# Patient Record
Sex: MALE | Race: WHITE | Employment: UNEMPLOYED | URBAN - METROPOLITAN AREA
[De-identification: names, ages, dates, MRNs, and addresses within clinical notes are randomized per-mention and may not be internally consistent; named-entity substitution may affect disease eponyms.]

---
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
## 2022-09-13 ENCOUNTER — OFFICE VISIT (OUTPATIENT)
Dept: PODIATRY | Age: 22
End: 2022-09-13
Payer: MEDICARE

## 2022-09-13 VITALS — BODY MASS INDEX: 21.79 KG/M2 | HEIGHT: 66 IN

## 2022-09-13 DIAGNOSIS — M79.674 PAIN IN TOE OF RIGHT FOOT: ICD-10-CM

## 2022-09-13 DIAGNOSIS — L03.031 PARONYCHIA OF GREAT TOE OF RIGHT FOOT: Primary | ICD-10-CM

## 2022-09-13 DIAGNOSIS — L60.0 ONYCHOCRYPTOSIS: ICD-10-CM

## 2022-09-13 PROCEDURE — 1036F TOBACCO NON-USER: CPT | Performed by: PODIATRIST

## 2022-09-13 PROCEDURE — G8427 DOCREV CUR MEDS BY ELIG CLIN: HCPCS | Performed by: PODIATRIST

## 2022-09-13 PROCEDURE — G8420 CALC BMI NORM PARAMETERS: HCPCS | Performed by: PODIATRIST

## 2022-09-13 PROCEDURE — 99213 OFFICE O/P EST LOW 20 MIN: CPT | Performed by: PODIATRIST

## 2022-09-13 RX ORDER — DOXYCYCLINE HYCLATE 100 MG/1
100 CAPSULE ORAL 2 TIMES DAILY
Qty: 28 CAPSULE | Refills: 0 | Status: SHIPPED | OUTPATIENT
Start: 2022-09-13 | End: 2022-09-27

## 2022-09-13 ASSESSMENT — ENCOUNTER SYMPTOMS
BACK PAIN: 0
NAUSEA: 0
SHORTNESS OF BREATH: 0
COLOR CHANGE: 0
DIARRHEA: 0

## 2022-09-13 NOTE — PROGRESS NOTES
[address] [facility] Podiatry  Return Patient Progress Note    Subjective: Sharan Macknezie 25 y.o. male that presents with chief complaint of possible ingrown nail to the right great toe. Chief Complaint   Patient presents with    Ingrown Toenail     Right hallux, possible ingrown, has some drainage     Patient states that this has been present for about one week. Patient states that he has been getting some clear drainage to the right great toe. Pain is rated 5 out of 10 and is described as intermittent. Patient denies any treatment to the right great toe prior to today. Review of Systems   Constitutional:  Negative for activity change, appetite change, chills, diaphoresis, fatigue and fever. Respiratory:  Negative for shortness of breath. Cardiovascular:  Negative for leg swelling. Gastrointestinal:  Negative for diarrhea and nausea. Endocrine: Negative for cold intolerance, heat intolerance and polyuria. Musculoskeletal:  Negative for arthralgias, back pain, gait problem, joint swelling and myalgias. Skin:  Negative for color change, pallor, rash and wound. Allergic/Immunologic: Negative for environmental allergies and food allergies. Neurological:  Negative for dizziness, weakness, light-headedness and numbness. Hematological:  Does not bruise/bleed easily. Psychiatric/Behavioral:  Negative for behavioral problems, confusion and self-injury. The patient is not nervous/anxious. Objective: Clinical evaluation of the patient reveals ingrowth to the medial border of the right hallux toenail. There is erythema and edema noted to the right hallux. There is no calor noted. There is mild serous drainage noted to the medial border of the right hallux, but no malodor is noted. There is pain with palpation to the right hallux. Assessment:    Diagnosis Orders   1. Paronychia of great toe of right foot  doxycycline hyclate (VIBRAMYCIN) 100 MG capsule      2.  Onychocryptosis  doxycycline hyclate (VIBRAMYCIN) 100 MG capsule      3. Pain in toe of right foot  doxycycline hyclate (VIBRAMYCIN) 100 MG capsule            Plan: 1. Clinical evaluation of the patient. 2. I recommended removing the ingrown nail from the right hallux, but the patient declines. Therefore, the patient is to soak the right hallux for 20 minutes daily with warm water and Epsom salts. Patient was given a prescription for doxycycline 100 mg bid for 14 days. Patient will be seen in 2 weeks and at that time I may again recommend removal of the ingrown nail. 3. Return in about 2 weeks (around 9/27/2022) for evaluation of ingrown nail right hallux.    9/13/2022      Magan Hebert DPM

## 2022-09-19 ENCOUNTER — OFFICE VISIT (OUTPATIENT)
Dept: PODIATRY | Age: 22
End: 2022-09-19
Payer: MEDICARE

## 2022-09-19 VITALS — BODY MASS INDEX: 26.03 KG/M2 | WEIGHT: 162 LBS | HEIGHT: 66 IN

## 2022-09-19 DIAGNOSIS — M79.674 PAIN IN TOE OF RIGHT FOOT: ICD-10-CM

## 2022-09-19 DIAGNOSIS — L60.0 ONYCHOCRYPTOSIS: ICD-10-CM

## 2022-09-19 DIAGNOSIS — L03.031 PARONYCHIA OF GREAT TOE OF RIGHT FOOT: Primary | ICD-10-CM

## 2022-09-19 PROCEDURE — 1036F TOBACCO NON-USER: CPT | Performed by: PODIATRIST

## 2022-09-19 PROCEDURE — G8419 CALC BMI OUT NRM PARAM NOF/U: HCPCS | Performed by: PODIATRIST

## 2022-09-19 PROCEDURE — 99213 OFFICE O/P EST LOW 20 MIN: CPT | Performed by: PODIATRIST

## 2022-09-19 PROCEDURE — 11750 EXCISION NAIL&NAIL MATRIX: CPT | Performed by: PODIATRIST

## 2022-09-19 PROCEDURE — G8427 DOCREV CUR MEDS BY ELIG CLIN: HCPCS | Performed by: PODIATRIST

## 2022-09-19 ASSESSMENT — ENCOUNTER SYMPTOMS
DIARRHEA: 0
SHORTNESS OF BREATH: 0
BACK PAIN: 0
COLOR CHANGE: 0
NAUSEA: 0

## 2022-09-19 NOTE — PROGRESS NOTES
[address] [place] [address] Podiatry  Return Patient Progress Note    Subjective: Carolyn Chamorro 25 y.o. male that presents for follow up evaluation of ingrown nail to the right great toe. Chief Complaint   Patient presents with    Ingrown Toenail     Ingrown right hallux toenail     Patient states that the toenail has gotten worse since his last visit. Pain is rated 8 out of 10 and is described as intermittent. Review of Systems   Constitutional:  Negative for activity change, appetite change, chills, diaphoresis, fatigue and fever. Respiratory:  Negative for shortness of breath. Cardiovascular:  Negative for leg swelling. Gastrointestinal:  Negative for diarrhea and nausea. Endocrine: Negative for cold intolerance, heat intolerance and polyuria. Musculoskeletal:  Negative for arthralgias, back pain, gait problem, joint swelling and myalgias. Skin:  Negative for color change, pallor, rash and wound. Allergic/Immunologic: Negative for environmental allergies and food allergies. Neurological:  Negative for dizziness, weakness, light-headedness and numbness. Hematological:  Does not bruise/bleed easily. Psychiatric/Behavioral:  Negative for behavioral problems, confusion and self-injury. The patient is not nervous/anxious. Objective: Clinical evaluation of the patient reveals increase in ingrowth to the medial border of the right hallux toenail. There is erythema and edema noted to the right hallux. There is no calor noted. There is mild serous drainage noted to the medial border of the right hallux, but no malodor is noted. There is pain with palpation to the right hallux. Assessment:    Diagnosis Orders   1. Paronychia of great toe of right foot  UT REMOVAL OF NAIL BED      2. Onychocryptosis  UT REMOVAL OF NAIL BED      3. Pain in toe of right foot  UT REMOVAL OF NAIL BED            Plan: 1. Clinical evaluation of the patient.  2. I recommended a partial nail avulsion with chemical matricectomy to the medial borders of the right hallux. A consent was signed and placed in the chart. The right hallux was anesthetized with 3 cc of 0.5% Marcaine plain. A tourniquet was placed at the base of the toe. The area was then prepped and draped in an aseptic manner. A hemostat was then utilized to elevate the medial and proximal skin folds away from the underlying nail plate border. The hemostat was then utilized to elevate the medial nail plate border away from the underlying nailbed. A [place] blade was then utilized to excise this nail plate border. A hemostat was utilized to remove this nail plate border from the field. A curette was utilized to ensure that all nail remnants were removed. The nail matrix in this area was then treated with 3 applications of 85% phenol for 15 seconds each. The area was then irrigated with copious amounts of sterile saline solution. The wound was dressed with antibiotic oinment and a dry sterile dressing. The patient was given post-operative care and dressing instructions. The patient was encouraged to call or come in if any concerns arise. 3. Return in about 2 weeks (around 10/3/2022) for first post op matricectomy right hallux.    9/19/2022      Antonio Epperson DPM